# Patient Record
(demographics unavailable — no encounter records)

---

## 2024-11-05 NOTE — PHYSICAL EXAM
[] : no respiratory distress [Exaggerated Use Of Accessory Muscles For Inspiration] : no accessory muscle use [Abdomen Soft] : soft [Nondistended] : nondistended [Abdomen Tenderness] : non-tender [Supraclavicular Lymph Nodes Enlarged Bilaterally] : supraclavicular [Axillary Lymph Nodes Enlarged Bilaterally] : axillary [Normal] : no focal deficits [de-identified] : s/p left mastectomy with reconstruction; the left reconstructed breast is notable smaller with firm texture due to presence of tissue expander with post-RT changes of the skin [de-identified] : patient has difficulty raising her left arm all the way above her head - can only raise arm up to the level of her ear [de-identified] : faint hyperpigmentation left chest wall w/in RT field

## 2024-11-05 NOTE — REVIEW OF SYSTEMS
[Fatigue] : fatigue [Joint Pain] : joint pain [Negative] : Allergic/Immunologic [Diarrhea] : no diarrhea

## 2024-11-05 NOTE — DISEASE MANAGEMENT
[Pathological] : TNM Stage: p [IB] : IB [TTNM] : 3 [NTNM] : 1 [MTNM] : x [de-identified] : left reconstructed breast / Chest wall16 Fx to 4240 cGY on 10/11/2023.

## 2024-11-05 NOTE — HISTORY OF PRESENT ILLNESS
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 She is now s/p RT to the left CW/reconstructed breast and regional nodes 16 Fxs to 4240 cGy completed on 10/11/2023.  She had a follow-up with Dr. Avila on 2/12/24 and had moderate radiation changes in the left skin at that time. Her implant exchange was deferred by Dr. Kolb due to significant edema and post-XRT changes.  Patient last saw Dr. Jacobsen on 3/21/24. She is tolerating Anastrozole well and was started on Verzenio in December 2023 which has been causing her to have diarrhea. She is also receiving adjuvant zoledronic acid for osteopenia and high-risk breast cancer. Scheduled to see Dr. Jacobsen at the end of May.  11/5/2024 Ms. Enriqueta Lopes presents today for a follow up. She had a recent appointment on 9/23/2024 with Dr. Lerman for a delayed breast reconstruction. She continues on Anastrozole and Verzenio under the care of Dr. Jacobsen.  She has been doing very well on these medications and is not experiencing any major side effects.  She will re visit reconstruction with Dr Rizzo after the holidays.  She is due for Zometa infusion in December and will see Dr Jacobsen at that time.

## 2024-11-05 NOTE — PHYSICAL EXAM
[] : no respiratory distress [Exaggerated Use Of Accessory Muscles For Inspiration] : no accessory muscle use [Abdomen Soft] : soft [Nondistended] : nondistended [Abdomen Tenderness] : non-tender [Supraclavicular Lymph Nodes Enlarged Bilaterally] : supraclavicular [Axillary Lymph Nodes Enlarged Bilaterally] : axillary [Normal] : no focal deficits [de-identified] : s/p left mastectomy with reconstruction; the left reconstructed breast is notable smaller with firm texture due to presence of tissue expander with post-RT changes of the skin [de-identified] : patient has difficulty raising her left arm all the way above her head - can only raise arm up to the level of her ear [de-identified] : faint hyperpigmentation left chest wall w/in RT field

## 2024-11-05 NOTE — HISTORY OF PRESENT ILLNESS
[FreeTextEntry1] : Enriqueta Lopes is a 61-year female with stage IB (pT3N1 grade 2 ER+/MS+/Her2-) IDC of the left breast status-post mastectomy and axillary lymph node dissection followed by adjuvant radiation. She is now s/p RT to the left CW/reconstructed breast and regional nodes 16 Fxs to 4240 cGy completed on 10/11/2023.  She had a follow-up with Dr. Avila on 2/12/24 and had moderate radiation changes in the left skin at that time. Her implant exchange was deferred by Dr. Kolb due to significant edema and post-XRT changes.  Patient last saw Dr. Jacobsen on 3/21/24. She is tolerating Anastrozole well and was started on Verzenio in December 2023 which has been causing her to have diarrhea. She is also receiving adjuvant zoledronic acid for osteopenia and high-risk breast cancer. Scheduled to see Dr. Jacobsen at the end of May.  11/5/2024 Ms. Enriqueta Lopes presents today for a follow up. She had a recent appointment on 9/23/2024 with Dr. Lerman for a delayed breast reconstruction. She continues on Anastrozole and Verzenio under the care of Dr. Jacobsen.  She has been doing very well on these medications and is not experiencing any major side effects.  She will re visit reconstruction with Dr Rizzo after the holidays.  She is due for Zometa infusion in December and will see Dr Jacobsen at that time.

## 2025-02-17 NOTE — HISTORY OF PRESENT ILLNESS
[FreeTextEntry1] : Enriqueta presents to review her surgical plan, she is scheduled for delayed L breast GIOVANNA flap reconstruction on 3/12/25 at [facility]. Per her medical oncologist, Dr. Renee Jacobsen, she is holding verzenio 2 weeks pre op and does not have to hole zometa or anastrozole.

## 2025-02-17 NOTE — PHYSICAL EXAM
[de-identified] : Abscence of left breast with tissue expander in place, transverse mastectomy scar, absence of NAC, has radiation fibrosis and mild hyperpigmentation with high grade capsule contracture - the left TE is partially inflated there is minimal skin envelope - she will need resurfacing of the skin envelope to recreate the lowe pole. Rioght breast has grade III ptosis and large pendulous breast  [de-identified] : Soft, non-tender, non-distended, well healed Lap assisted hysterectomy scars, + skin laxity, lipodystrophy adequate for GIOVANNA flap reconstruction

## 2025-02-17 NOTE — PHYSICAL EXAM
[de-identified] : Abscence of left breast with tissue expander in place, transverse mastectomy scar, absence of NAC, has radiation fibrosis and mild hyperpigmentation with high grade capsule contracture - the left TE is partially inflated there is minimal skin envelope - she will need resurfacing of the skin envelope to recreate the lowe pole. Rioght breast has grade III ptosis and large pendulous breast  [de-identified] : Soft, non-tender, non-distended, well healed Lap assisted hysterectomy scars, + skin laxity, lipodystrophy adequate for GIOVANNA flap reconstruction

## 2025-02-17 NOTE — ASSESSMENT
[FreeTextEntry1] : I reviewed with Enriqueta the surgical plan for delayed L GIOVANNA flap reconstruction.  We will remove the tissue expander at the same time she is lacking in adequate skin envelope and she will need a large skin island from the abdomen to resurface the breast mound she will have delayed right breast symmetrizing surgery we will not operate on the right breast during this procedure I reviewed the perioperative plan she will have drains she will be in the hospital for approximately 2 days we will likely reinforce the abdominal donor site with biologic mesh, and we will identify the transected thoracic intercostal sensory nerve branch for possible neurorrhaphy and interposition nerve grafting.  She stated that I answered all of her questions.  She will have preop telemedicine consult with Renuka to go over postop instructions

## 2025-03-24 NOTE — ASSESSMENT
[FreeTextEntry1] : Enriqueta is healing well 1 week postop I reviewed with her all of the postop care instructions Aquaphor moisturizer to all of the incisions shower regularly continue aspirin until 2 weeks postop sports bra follow-up with nurse practitioner Chelsey next week for JAMIE drain removal she has 1 remaining abdominal JAMIE I gave her prescription for physical therapy return to the office to see me in 3 weeks

## 2025-03-24 NOTE — PHYSICAL EXAM
[de-identified] : The left reconstructed breast mound and the GIOVANNA flap is warm soft viable the incision line is clean dry and intact there is no sign of any infection or collection she is healing well I removed the JAMIE drain.  I removed the Doppler wire [de-identified] : Soft nontender nondistended I removed the incisional VAC dressing the umbilicus is viable there is no sign of any infection or collection the JAMIE drains are in place draining serosanguineous fluid.  I remove the left abdominal JAMIE drain she has the right abdominal drain remaining in place

## 2025-03-24 NOTE — SURGICAL HISTORY
[de-identified] : 3-12-25: Delayed left Kathy flap breast reconstruction removal of tissue expander

## 2025-03-24 NOTE — HISTORY OF PRESENT ILLNESS
[FreeTextEntry1] : Healing well 1 week postop from delayed left breast reconstruction with GIOVANNA flap and removal of tissue expander.  Healing well with no complaints reports pain well-controlled only taking over-the-counter Tylenol and Advil tolerating regular diet having normal bowel movements has the JAMIE drains and Prevena VAC in place

## 2025-03-25 NOTE — SURGICAL HISTORY
[de-identified] : 3-12-25: Delayed left Kathy flap breast reconstruction removal of tissue expander

## 2025-03-25 NOTE — HISTORY OF PRESENT ILLNESS
[FreeTextEntry1] : Enriqueta presents today for drain removal, 1 week post op S/P delayed left breast reconstruction with GIOVANNA flap and removal of tissue expander. No complaints, reports pain is controlled, denies f/c/n/v, last dose of aspirin is tomorrow. She has right abdominal drain in place.

## 2025-03-25 NOTE — PHYSICAL EXAM
[de-identified] : Left reconstructed breast mound is soft, upper inner pole with blanching erythema, flap is warm, healthy with good color and capillary refill. Incisions healing well, no collections.  [de-identified] : Soft, non-tender, non-distended, incision healing well, no collections or infections, right abdominal drain removed.

## 2025-03-25 NOTE — ASSESSMENT
[FreeTextEntry1] : Last drain removed today. Enriqueta has a new area of blanching erythema to left breast inner upper pole. She denies any other symptoms but noticed the reddened area a few days ago.  -bactirm abx course sent  -post op instructions reviewed  -activity restrictions reviewed  -aquaphor to incisions  -shower regularly  -sports bra -f/u in 1 week

## 2025-04-01 NOTE — SURGICAL HISTORY
[de-identified] : 3-12-25: Delayed left Kathy flap breast reconstruction removal of tissue expander

## 2025-04-01 NOTE — HISTORY OF PRESENT ILLNESS
[FreeTextEntry1] : Enriqueta presents 3 weeks post op, S/P delayed left breast reconstruction with GIOVANNA flap and removal of tissue expander on 3/12/25. She was started on Bactirm last week for left breast upper inner pole erythema, she denies f/c/n/v, no complaints, feels well. Has not started PT yet.

## 2025-04-01 NOTE — ASSESSMENT
[FreeTextEntry1] : Erythema to left inner upper pole has resolved, I instructed her to complete her PO Bactrim course.  -self-breast massage, especially to areas of swelling  -encouraged her to schedule an appointment with PT  -post op instructions reviewed  -activity restrictions reviewed  -aquaphor to incisions  -shower regularly  -sports bra -RTC Dr. Lerman in 3 weeks

## 2025-04-01 NOTE — PHYSICAL EXAM
[de-identified] : Left reconstructed breast mound is soft, upper inner pole erythema has resolved. Normal postoperative swelling. flap is warm, healthy with good color and capillary refill. Incisions healing well, no collections.  [de-identified] : Soft, non-tender, non-distended, incision healing well, no collections or infections

## 2025-04-17 NOTE — ASSESSMENT
[FreeTextEntry1] : Enriqueta has 2 new areas of dehiscence to left breast IMF incision. I instructed her to apply aquaphor and dry gauze dressings BID and shower regularly. She has a f/u appt to see me next week for a wound check.  -continue PT  -aquaphor to incisions  -shower regularly  -sports bra

## 2025-04-17 NOTE — PHYSICAL EXAM
[de-identified] : Left reconstructed breast mound is soft. flap is warm, healthy with good color and capillary refill. No collections or infections. There are 2 new areas of dehiscence to left breast IMF incision, mid IMF 1cm x 0.5cm and lateral IMF pinpoint area of dehiscense 5mm in size.  [de-identified] : Soft, non-tender, non-distended, incision healing well, no collections or infections

## 2025-04-17 NOTE — SURGICAL HISTORY
[de-identified] : 3-12-25: Delayed left Kathy flap breast reconstruction removal of tissue expander

## 2025-04-17 NOTE — HISTORY OF PRESENT ILLNESS
[FreeTextEntry1] : Enriqueta presents 5 weeks post op, S/P delayed left breast reconstruction with GIOVANNA flap and removal of tissue expander on 3/12/25. She started PT, presents c/o new area of wound healing delay to Left breast IMF incision. Denies f/c/n/v.

## 2025-04-23 NOTE — HISTORY OF PRESENT ILLNESS
[FreeTextEntry1] : Enriqueta presents for a wound check, S/P delayed left breast reconstruction with GIOVANNA flap and removal of tissue expander on 3/12/25. She started PT, presented with 2 new areas of dehiscence last week to left breast IMF incision. Was started on Bactrim over the weekend for temp to 101. Her temperature in-office today was 99.0. She has been doing aquaphor and dry gauze dressings to IMF wounds.

## 2025-04-23 NOTE — PHYSICAL EXAM
[de-identified] : Left reconstructed breast mound is soft. flap is warm, healthy with good color and capillary refill. No collections or infections. Mid IMF wound 1cm x 0.5cm has healthy wound base, and lateral IMF pinpoint area is almost closed. New area of redness to left sternal border, tender on palpation with blanching erythema  [de-identified] : Soft, non-tender, non-distended, incision healing well, no collections or infections

## 2025-04-23 NOTE — SURGICAL HISTORY
[de-identified] : 3-12-25: Delayed left Kathy flap breast reconstruction removal of tissue expander

## 2025-04-23 NOTE — PHYSICAL EXAM
[de-identified] : Left reconstructed breast mound is soft. flap is warm, healthy with good color and capillary refill. No collections or infections. Mid IMF wound 1cm x 0.5cm has healthy wound base, and lateral IMF pinpoint area is almost closed. New area of redness to left sternal border, tender on palpation with blanching erythema  [de-identified] : Soft, non-tender, non-distended, incision healing well, no collections or infections

## 2025-04-23 NOTE — SURGICAL HISTORY
[de-identified] : 3-12-25: Delayed left Kathy flap breast reconstruction removal of tissue expander

## 2025-04-23 NOTE — ASSESSMENT
[FreeTextEntry1] : Left lateral pinpoint area of dehiscence is almost completely healed, continue aquaphor and dry gauze dressings BID to mid IMF wound and shower regularly. New area of tenderness and erythema to left sternal border with temps over the weekend. Discussed with Dr. Lerman, I instructed her to continue Bactrim course, Dr. Lerman will see her tomorrow in-office to ultrasound site and r/o abscess  -continue PT  -aquaphor to incisions  -shower regularly  -sports bra

## 2025-04-27 NOTE — PHYSICAL EXAM
[de-identified] : Left reconstructed breast mound is soft. flap is warm, healthy with good color and capillary refill. No collections or infections. Mid IMF wound 1cm x 0.5cm has healthy wound base, and lateral IMF pinpoint area is almost closed. Assessed area of erythema to left sternal border with ultrasound, no collection seen on ultrasound, no fluctuance on exam  [de-identified] : Soft, non-tender, non-distended, incision healing well, no collections or infections

## 2025-04-27 NOTE — SURGICAL HISTORY
[de-identified] : 3-12-25: Delayed left Kathy flap breast reconstruction removal of tissue expander

## 2025-04-27 NOTE — ASSESSMENT
[FreeTextEntry1] : Tender erythematous area to left sternal border ultrasound exam, no collection, no abscess, no fluctuance on exam.  -continue Bactrim abx course  -collagenase to left IMF wound  -aquaphor to other incisions  -f/u next week

## 2025-04-27 NOTE — SURGICAL HISTORY
[de-identified] : 3-12-25: Delayed left Kathy flap breast reconstruction removal of tissue expander

## 2025-04-27 NOTE — HISTORY OF PRESENT ILLNESS
[FreeTextEntry1] : Enriqueta presents for a wound check, S/P delayed left breast reconstruction with GIOVANNA flap and removal of tissue expander on 3/12/25. She started PT, presented with 2 new areas of dehiscence last week to left breast IMF incision. Was started on Bactrim over the weekend for temp to 101. She has been doing aquaphor and dry gauze dressings to IMF wounds. She has not experienced any fevers for the past 24h.

## 2025-04-29 NOTE — HISTORY OF PRESENT ILLNESS
[FreeTextEntry1] : Enriqueta presents for a wound check, S/P delayed left breast reconstruction with GIOVANNA flap and removal of tissue expander on 3/12/25. No collection was seen on ultrasound exam of erythema to left sternal border. She has completed her Bactrim course. She has continued aquaphor with dry gauze dressings to left breast IMF wound while she has been waiting for collagenase to get approved by her insurance. Her prescription is ready today and she will begin collagenase dressings today. She has not had any fevers in 6 days.

## 2025-04-29 NOTE — SURGICAL HISTORY
[de-identified] : 3-12-25: Delayed left Kathy flap breast reconstruction removal of tissue expander

## 2025-04-29 NOTE — ASSESSMENT
[FreeTextEntry1] : Tender erythematous area to left sternal border is unchanged, no fluctuance on exam. Enriqueta has not had any fevers for 6 days and has completed her bactrim course over the weekend -collagenase to left IMF wound  -aquaphor to other incisions  -f/u next week with Dr. Lerman

## 2025-04-29 NOTE — PHYSICAL EXAM
[de-identified] : Left reconstructed breast mound is soft. flap is warm, healthy with good color and capillary refill. No collections or infections. Mid IMF wound has decreased in size, 0.5cm x 0.5cm has healthy wound base, and lateral IMF pinpoint area is almost closed. area of erythema to left sternal border is stable, no fluctuance on exam  [de-identified] : Soft, non-tender, non-distended, incision well healed, no collections or infections

## 2025-05-05 NOTE — SURGICAL HISTORY
[de-identified] : 3-12-25: Delayed left Kathy flap breast reconstruction removal of tissue expander

## 2025-05-05 NOTE — ASSESSMENT
[FreeTextEntry1] : Tender erythematous area to left sternal border is expanding had imporved with previous round of bactrim along the chest wall 3rd costal cartilage. Ultrasound exam did not show any collection. she finished her Abx course and now it has spread to a much wider area along the sternum and medial breast.   There is no fluctuace but it is tender. She remains febrile. No constitutional signs. We will order a chest CT scan. And start cipro today  I referred her to Dr. Omari Kay of ID for consultation  Discussed with Dr. Avila  -CT chest with contrast ordered -CBC, CMP, ESR, CRP ordered  -start cipro 500mg BID  -bactroban topical ointment to erythema  -continue collagenase to left IMF wound   -RTC in 1 week

## 2025-05-05 NOTE — SURGICAL HISTORY
[de-identified] : 3-12-25: Delayed left Kathy flap breast reconstruction removal of tissue expander

## 2025-05-05 NOTE — HISTORY OF PRESENT ILLNESS
[FreeTextEntry1] : Enriqueta presents S/P delayed left breast reconstruction with GIOVANNA flap and removal of tissue expander on 3/12/25. She has erythema to the left sternal border, no collection was seen on ultrasound exam 2 weeks ago. She has been doing daily dressing changes with collagenase to left breast IMF wound. Denies f/c/n/v.

## 2025-05-05 NOTE — PHYSICAL EXAM
[de-identified] : A leonardo of erythema to left sternal border is expanding. Left reconstructed breast mound is soft. flap is warm, healthy with good color and capillary refill. No collections or infections. Mid IMF wound has decreased in size, 0.5cm x 0.5cm has healthy wound base, and lateral IMF pinpoint area is almost closed. [de-identified] : Soft, non-tender, non-distended, incision well healed, no collections or infections

## 2025-05-05 NOTE — PHYSICAL EXAM
[de-identified] : A leonardo of erythema to left sternal border is expanding. Left reconstructed breast mound is soft. flap is warm, healthy with good color and capillary refill. No collections or infections. Mid IMF wound has decreased in size, 0.5cm x 0.5cm has healthy wound base, and lateral IMF pinpoint area is almost closed. [de-identified] : Soft, non-tender, non-distended, incision well healed, no collections or infections

## 2025-05-12 NOTE — HISTORY OF PRESENT ILLNESS
[FreeTextEntry1] : Enriqueta presents S/P delayed left breast reconstruction with GIOVANNA flap and removal of tissue expander on 3/12/25. She has erythema to the left sternal border. CT scan shows collection consistent with possible abscess overlying the 2nd costal resection site. She has been doing daily dressing changes with collagenase to left breast IMF wound. Denies f/c/n/v. On cipro. Was seen by Dr. Reece last week and she got 1 dose of IV Dalbavancin.

## 2025-05-12 NOTE — SURGICAL HISTORY
[de-identified] : 3-12-25: Delayed left Kathy flap breast reconstruction removal of tissue expander

## 2025-05-12 NOTE — ASSESSMENT
[FreeTextEntry1] : Tender erythematous area to left sternal border I&D'd today F/U culture Continue BID packing strip F/U with ID RTC next week

## 2025-05-12 NOTE — PHYSICAL EXAM
[de-identified] : Area of erythema to left sternal border is stable - I perfoed incision & drainage overlying the colleciton as seen on CT scan making a 1cm transverse incision with 15 blade and spread with scissors until entering the pocket - cloudy serous drainage - no purulence. Culture sent and irrigated with normal saline and then packed gently with 1/4 inch iodoform packing strip.  [de-identified] : Soft, non-tender, non-distended, incision well healed, no collections or infections

## 2025-05-12 NOTE — PHYSICAL EXAM
[de-identified] : Area of erythema to left sternal border is stable - I perfoed incision & drainage overlying the colleciton as seen on CT scan making a 1cm transverse incision with 15 blade and spread with scissors until entering the pocket - cloudy serous drainage - no purulence. Culture sent and irrigated with normal saline and then packed gently with 1/4 inch iodoform packing strip.  [de-identified] : Soft, non-tender, non-distended, incision well healed, no collections or infections

## 2025-05-12 NOTE — SURGICAL HISTORY
[de-identified] : 3-12-25: Delayed left Kathy flap breast reconstruction removal of tissue expander

## 2025-05-14 NOTE — DISEASE MANAGEMENT
[Pathological] : TNM Stage: p [IB] : IB [TTNM] : 3 [NTNM] : 1 [MTNM] : x [de-identified] : left reconstructed breast / Chest wall16 Fx to 4240 cGY on 10/11/2023.

## 2025-05-14 NOTE — DISEASE MANAGEMENT
[Pathological] : TNM Stage: p [IB] : IB [TTNM] : 3 [NTNM] : 1 [MTNM] : x [de-identified] : left reconstructed breast / Chest wall16 Fx to 4240 cGY on 10/11/2023.

## 2025-05-14 NOTE — HISTORY OF PRESENT ILLNESS
[FreeTextEntry1] : Enriqueta Lopes is a 61-year female with stage IB (pT3N1 grade 2 ER+/AL+/Her2-) IDC of the left breast status-post mastectomy and axillary lymph node dissection followed by adjuvant radiation. She is now s/p RT to the left CW/reconstructed breast and regional nodes 16 Fxs to 4240 cGy completed on 10/11/2023.  3/12/25  delayed left breast reconstruction with GIOVANNA flap and removal of tissue expander with Dr Schmidt.  She saw him for follow up on May 5 2025:  Dr Schmidt noted Cellulitis (682.9) (L03.90)Tender erythematous area to left sternal border is expanding had improved with previous round of bactrim along the chest wall 3rd costal cartilage. Ultrasound exam did not show any collection. she finished her Abx course and now it has spread to a much wider area along the sternum and medial breast. He will order a chest CT scan. And start cipro. He I referred her to Dr. Omari Kay of ID for consultation. She will see him again in one week  CT scan 5/8/25  IMPRESSION: Deep medial left upper chest wall sterile versus infectious collection abutting 2nd and 3rd costosternal joints and associated with a tiny area of erosive change in the distal 2nd rib. If indeed clinical history is consistent with infection, findings raise the possibility for septic costosternal joint and/or osteomyelitis of the 2nd rib. She saw Dr Lerman on 5/14/25 - He sent culture of the wound.  She is still on cipro.  She also got one dose of Dalbavancin IV as well and was seen by ID  She saw Dr Renee Jacobsen yesterday and will be holding Verzenio but still taking Arimidex..  She is getting a PICC line today for IV antibiotics.  Her breast is packed with iodoform gauze.  It is tender to the touch and the area is reddened.

## 2025-05-14 NOTE — PHYSICAL EXAM
[] : no respiratory distress [Respiration, Rhythm And Depth] : normal respiratory rhythm and effort [Exaggerated Use Of Accessory Muscles For Inspiration] : no accessory muscle use [Abdomen Soft] : soft [Nondistended] : nondistended [Abdomen Tenderness] : non-tender [Normal] : oriented to person, place and time, the affect was normal, the mood was normal and not anxious [de-identified] : the right breast is unremarkable; the left breast is s/p mastectomy and GIOVANNA reconstruction; the incision in the inframammary fold is healing; the uperior/medial aspect of the breast is erythematous in a pattern c/w cellulitis; there is an opening packed with gauze [de-identified] : s/p left ALND; well healed incision; no palpable nodes [de-identified] : no e/o lymphedema LUE; she has limited ROM LUE [de-identified] : erythema as above

## 2025-05-14 NOTE — PHYSICAL EXAM
[] : no respiratory distress [Respiration, Rhythm And Depth] : normal respiratory rhythm and effort [Exaggerated Use Of Accessory Muscles For Inspiration] : no accessory muscle use [Abdomen Soft] : soft [Nondistended] : nondistended [Abdomen Tenderness] : non-tender [Normal] : oriented to person, place and time, the affect was normal, the mood was normal and not anxious [de-identified] : the right breast is unremarkable; the left breast is s/p mastectomy and GIOVANNA reconstruction; the incision in the inframammary fold is healing; the uperior/medial aspect of the breast is erythematous in a pattern c/w cellulitis; there is an opening packed with gauze [de-identified] : s/p left ALND; well healed incision; no palpable nodes [de-identified] : no e/o lymphedema LUE; she has limited ROM LUE [de-identified] : erythema as above

## 2025-05-14 NOTE — HISTORY OF PRESENT ILLNESS
[FreeTextEntry1] : Enriqueta Lopes is a 61-year female with stage IB (pT3N1 grade 2 ER+/NC+/Her2-) IDC of the left breast status-post mastectomy and axillary lymph node dissection followed by adjuvant radiation. She is now s/p RT to the left CW/reconstructed breast and regional nodes 16 Fxs to 4240 cGy completed on 10/11/2023.  3/12/25  delayed left breast reconstruction with GIOVANNA flap and removal of tissue expander with Dr Schmidt.  She saw him for follow up on May 5 2025:  Dr Schmidt noted Cellulitis (682.9) (L03.90)Tender erythematous area to left sternal border is expanding had improved with previous round of bactrim along the chest wall 3rd costal cartilage. Ultrasound exam did not show any collection. she finished her Abx course and now it has spread to a much wider area along the sternum and medial breast. He will order a chest CT scan. And start cipro. He I referred her to Dr. Omari Kay of ID for consultation. She will see him again in one week  CT scan 5/8/25  IMPRESSION: Deep medial left upper chest wall sterile versus infectious collection abutting 2nd and 3rd costosternal joints and associated with a tiny area of erosive change in the distal 2nd rib. If indeed clinical history is consistent with infection, findings raise the possibility for septic costosternal joint and/or osteomyelitis of the 2nd rib. She saw Dr Lerman on 5/14/25 - He sent culture of the wound.  She is still on cipro.  She also got one dose of Dalbavancin IV as well and was seen by ID  She saw Dr Renee Jacobsen yesterday and will be holding Verzenio but still taking Arimidex..  She is getting a PICC line today for IV antibiotics.  Her breast is packed with iodoform gauze.  It is tender to the touch and the area is reddened.

## 2025-05-26 NOTE — PHYSICAL EXAM
[de-identified] : Erythema to left breast and sternal border is improved - I replaced dressing, packed with 1/4 inch iodoform packing strip. IMF pinpoint wound is almost closed. Left breast is soft, no collections, flap is healthy  [de-identified] : Soft, non-tender, non-distended, incision well healed, no collections or infections

## 2025-05-26 NOTE — PHYSICAL EXAM
[de-identified] : Erythema to left breast and sternal border is improved - I replaced dressing, packed with 1/4 inch iodoform packing strip. IMF pinpoint wound is almost closed. Left breast is soft, no collections, flap is healthy  [de-identified] : Soft, non-tender, non-distended, incision well healed, no collections or infections

## 2025-05-26 NOTE — HISTORY OF PRESENT ILLNESS
[FreeTextEntry1] : Enriqueta presents S/P delayed left breast reconstruction with GIOVANNA flap and removal of tissue expander on 3/12/25. She has erythema to the left sternal border. CT scan shows collection consistent with possible abscess overlying the 2nd costal resection site. Site was I&D-ed in office at her last visit, she has been doing BID dressing changes with iodoform packing. Culture is negative. Denies f/c/n/v. She has seen Dr. Kay last week, PICC line was placed, Cipro d/c-ed, and she is on daily IV daptomycin infusions.

## 2025-05-26 NOTE — ASSESSMENT
[FreeTextEntry1] : Left chest / breast cellulitis is improving -continue PICC line, abx per ID  -Continue BID packing strip -shower regularly RTC next week

## 2025-05-26 NOTE — SURGICAL HISTORY
[de-identified] : 3-12-25: Delayed left Kathy flap breast reconstruction removal of tissue expander

## 2025-05-26 NOTE — SURGICAL HISTORY
[de-identified] : 3-12-25: Delayed left Kathy flap breast reconstruction removal of tissue expander

## 2025-05-28 NOTE — HISTORY OF PRESENT ILLNESS
[FreeTextEntry1] : Enriqueta presents S/P delayed left breast reconstruction with GIOVANNA flap and removal of tissue expander on 3/12/25. She has erythema to the left sternal border. CT scan shows collection consistent with possible abscess overlying the 2nd costal resection site. Site was I&D-ed in office by Dr. Lerman, she has been doing BID dressing changes with iodoform packing. Culture is negative. Denies f/c/n/v. She has been following with Dr. Kay (ID), PICC line was placed, she is on daily IV daptomycin infusions. She reports tenderness has improved to site.

## 2025-05-28 NOTE — PHYSICAL EXAM
[de-identified] : Erythema to left breast and sternal border continues to improve - I replaced dressing, packed with 1/4 inch iodoform packing strip. IMF pinpoint wound is now closed. Left breast is soft, no collections, flap is healthy  [de-identified] : Soft, non-tender, non-distended, incision well healed, no collections or infections [de-identified] : RUE PICC line in place

## 2025-05-28 NOTE — ASSESSMENT
[FreeTextEntry1] : Left chest / breast cellulitis continues to improve. Site is less erythematous and less tender.  -continue PICC line, abx per ID  -Continue BID packing strip -shower regularly -f/u with Dr. Lerman in 2 weeks   
Within functional limits

## 2025-05-28 NOTE — SURGICAL HISTORY
[de-identified] : 3-12-25: Delayed left Kathy flap breast reconstruction removal of tissue expander

## 2025-06-09 NOTE — PHYSICAL EXAM
[de-identified] : Erythema to left breast and sternal border continues to improve - I replaced dressing, packed with 1/4 inch iodoform packing strip. Left breast is soft, no collections, flap is healthy  [de-identified] : Soft, non-tender, non-distended, incision well healed, no collections or infections [de-identified] : RUE PICC line in place

## 2025-06-09 NOTE — HISTORY OF PRESENT ILLNESS
[FreeTextEntry1] : Enriqueta presents S/P delayed left breast reconstruction with GIOVANNA flap and removal of tissue expander on 3/12/25. She is being treated for an abscess overlying the 2nd costal resection site. She has been doing BID dressing changes with iodoform packing. Denies f/c/n/v. She has been following with Dr. Kay (ID), PICC line was placed, her daily IV daptomycin infusions have now been switched to vancomycin. She reports tenderness has improved to site.

## 2025-06-09 NOTE — SURGICAL HISTORY
[de-identified] : 3-12-25: Delayed left Kathy flap breast reconstruction removal of tissue expander

## 2025-06-09 NOTE — SURGICAL HISTORY
[de-identified] : 3-12-25: Delayed left Kathy flap breast reconstruction removal of tissue expander

## 2025-06-09 NOTE — PHYSICAL EXAM
[de-identified] : Erythema to left breast and sternal border continues to improve - I replaced dressing, packed with 1/4 inch iodoform packing strip. Left breast is soft, no collections, flap is healthy  [de-identified] : Soft, non-tender, non-distended, incision well healed, no collections or infections [de-identified] : RUE PICC line in place

## 2025-06-09 NOTE — ASSESSMENT
[FreeTextEntry1] : Left chest / breast cellulitis continues to improve. Site is less erythematous and less tender.  -continue PICC line, abx per ID  -Continue BID packing strip -shower regularly -moisturize incisions  -RTC in 3 weeks

## 2025-07-14 NOTE — PHYSICAL EXAM
[de-identified] : RUE PICC line in place  [de-identified] : left breast and sternal border cellulitis is markedly improved - appears ot be resolved however there is minimal erythema of the radiated skin which may simply be reactive, non-tender, former drainage site has now closed. Left breast is soft, no collections, flap is healthy  [de-identified] : Soft, non-tender, non-distended, incision well healed, no collections or infections

## 2025-07-14 NOTE — ASSESSMENT
[FreeTextEntry1] : Left chest / breast cellulitis is improved - almost completely resolved. residual erythema of the radiated skin is not present when she wakes in the am and progresses during the day in the heat and is likely reactive and not related to the cellulitis.  -will touch base with Dr. Kay re: if any f/u imaging/bloodwork is necessary - I dont think she needs any imaging from my point of view - we will follow her clinically  -RTC in 1 month to reassess healing and eval readiness to resume Verzenio  - Will discuss with dr. Jacobsen

## 2025-07-14 NOTE — SURGICAL HISTORY
[de-identified] : 3-12-25: Delayed left Kathy flap breast reconstruction removal of tissue expander

## 2025-07-14 NOTE — HISTORY OF PRESENT ILLNESS
[FreeTextEntry1] : Enriqueta presents S/P delayed left breast reconstruction with GIOVANNA flap and removal of tissue expander on 3/12/25. She is being treated for an abscess overlying the 2nd costal resection site. The wound has since closed since her last visit.  She has been following with Dr. Kay (ID), she had an allergic reaction (hives) to IV Vanco 2 weeks ago that she was hospitalized for and PICC line was removed. She was then put on oral doxycycline which she also had an allergic reaction to (hives). All antibiotics were discontinued per Dr. Kay. She is currently holding Verzenio.

## 2025-07-14 NOTE — SURGICAL HISTORY
[de-identified] : 3-12-25: Delayed left Kathy flap breast reconstruction removal of tissue expander

## 2025-07-14 NOTE — PHYSICAL EXAM
[de-identified] : RUE PICC line in place  [de-identified] : left breast and sternal border cellulitis is markedly improved - appears ot be resolved however there is minimal erythema of the radiated skin which may simply be reactive, non-tender, former drainage site has now closed. Left breast is soft, no collections, flap is healthy  [de-identified] : Soft, non-tender, non-distended, incision well healed, no collections or infections

## 2025-07-19 NOTE — HISTORY OF PRESENT ILLNESS
[FreeTextEntry1] : Enriqueta presents for a wound check, S/P delayed left breast reconstruction with GIOVANNA flap and removal of tissue expander on 3/12/25. She started PT, presented with 2 new areas of dehiscence last week to left breast IMF incision. Was started on Bactrim over the weekend for temp to 101. Her temperature in-office today was 99.0. She has been doing aquaphor and dry gauze dressings to IMF wounds.    [Obese, well nourished, in no acute distress] : obese, well nourished, in no acute distress [de-identified] : TEB visit